# Patient Record
Sex: FEMALE | Race: WHITE | URBAN - METROPOLITAN AREA
[De-identification: names, ages, dates, MRNs, and addresses within clinical notes are randomized per-mention and may not be internally consistent; named-entity substitution may affect disease eponyms.]

---

## 2018-09-05 ENCOUNTER — APPOINTMENT (RX ONLY)
Dept: URBAN - METROPOLITAN AREA CLINIC 34 | Facility: CLINIC | Age: 83
Setting detail: DERMATOLOGY
End: 2018-09-05

## 2018-09-05 VITALS
SYSTOLIC BLOOD PRESSURE: 140 MMHG | DIASTOLIC BLOOD PRESSURE: 79 MMHG | HEART RATE: 57 BPM | HEIGHT: 68 IN | WEIGHT: 142 LBS

## 2018-09-05 DIAGNOSIS — R03.0 ELEVATED BLOOD-PRESSURE READING, WITHOUT DIAGNOSIS OF HYPERTENSION: ICD-10-CM

## 2018-09-05 DIAGNOSIS — L30.4 ERYTHEMA INTERTRIGO: ICD-10-CM

## 2018-09-05 PROBLEM — F32.9 MAJOR DEPRESSIVE DISORDER, SINGLE EPISODE, UNSPECIFIED: Status: ACTIVE | Noted: 2018-09-05

## 2018-09-05 PROCEDURE — ? COUNSELING

## 2018-09-05 PROCEDURE — 99202 OFFICE O/P NEW SF 15 MIN: CPT

## 2018-09-05 PROCEDURE — ? PRESCRIPTION

## 2018-09-05 RX ORDER — DESONIDE 0.5 MG/G
THIN LAYER CREAM TOPICAL BID
Qty: 30 | Refills: 1 | Status: ERX | COMMUNITY
Start: 2018-09-05

## 2018-09-05 RX ADMIN — DESONIDE THIN LAYER: 0.5 CREAM TOPICAL at 19:28

## 2018-09-05 ASSESSMENT — LOCATION SIMPLE DESCRIPTION DERM
LOCATION SIMPLE: RIGHT THIGH
LOCATION SIMPLE: ABDOMEN
LOCATION SIMPLE: LEFT BREAST
LOCATION SIMPLE: LEFT THIGH

## 2018-09-05 ASSESSMENT — LOCATION DETAILED DESCRIPTION DERM
LOCATION DETAILED: LEFT RIB CAGE
LOCATION DETAILED: LEFT ANTERIOR PROXIMAL THIGH
LOCATION DETAILED: RIGHT ANTERIOR PROXIMAL THIGH
LOCATION DETAILED: LEFT AXILLARY TAIL OF BREAST

## 2018-09-05 ASSESSMENT — LOCATION ZONE DERM
LOCATION ZONE: LEG
LOCATION ZONE: TRUNK

## 2018-09-05 NOTE — PROCEDURE: MIPS QUALITY
Quality 226: Preventive Care And Screening: Tobacco Use: Screening And Cessation Intervention: Patient screened for tobacco and never smoked
Quality 130: Documentation Of Current Medications In The Medical Record: Current Medications Documented
Quality 128: Preventive Care And Screening: Body Mass Index (Bmi) Screening And Follow-Up Plan: BMI is documented within normal parameters and no follow-up plan is required.
Detail Level: Detailed
Quality 317: Preventative Care And Screening: Screening For High Blood Pressure And Follow-Up Documented: Pre-hypertensive or hypertensive blood pressure reading documented, and the indicated follow-up is documented

## 2018-09-05 NOTE — PROCEDURE: COUNSELING
Detail Level: Detailed
Patient Specific Counseling (Will Not Stick From Patient To Patient): Reviewed that today on exam - suspect intertrigo- the script she has been using has been more helpful - however would rather move her to just steroid and a lower strength steroid and bring in some other measures to manage.  Do not find evidence of fungal infection today - did share that steroid use if it worsens the condition than there may be fungal present.  She has been using the mixed script so doing KOH would not be helpful - very little scale and no satellite lesions seen. \\nReviewed and handout given:\\nIntertrigo is a form of skin irritation that occurs when skin rest on skin such as in the arm pit, the creases of the neck, beneath breasts or in the groin. Factors that contribute to the development of this is moisture, friction, warmth and sweat retention. Treatment of choice is reducing the moisture by drying several times after bathing even using hair dryers to decrease the moisture. Mild topical steroids can decrease the irritation. Weight reduction is also helpful. Occasionally secondary infections can develop requiring topical antibiotics and or anti-fungal medications. \\n\\nWho gets intertrigo?\\nIntertrigo can affect males and females of any age. It is particularly common in people that are overweight or obese. Other contributing factors are: Genetic tendency to skin disease, excessive sweating.\\n\\nWhat are the features of intertrigo?\\nIntertrigo can be recent onset recurrent, or chronic defined as being present for more than 6 weeks. The exact appearance and behavior depends on the underlying cause or causes. The skin affected by intertrigo is inflamed. You will see that the is reddened and it cam uncomfortable due to associated itching or tenderness.  It may become moist and macerated, leading to cracks)and peeling.\\n\\nWhat is the cause of intertrigo?\\nIntertrigo is due to genetic and environmental factors.\\nFlexural skin has relatively high surface temperature\\nMoisture from insensible water loss and sweating cannot evaporate due to occlusion.\\nFriction from movement of adjacent skin results in chafing.\\nThe microorganisms that are normally resident on flexural skin, such as bacteria and yeast multiply in warm moist environments and may contribute to the inflammation. \\nWe can classify intertrigo into infectious and inflammatory origin but there is often overlap.\\n\\nInstructions for your treatment:\\n1.  Dry well likely will need to 2 - 3 x daily - with hair dryer on COLD setting.\\n2.  Apply Desonide to the affected areas 2 x daily\\n3.  Consider wearing a thin mens cotton sleeveless undershirt under the bra.\\n4.  Wear loose fitting clothing - keep as cool as possible - and loose clothing\\n5.  Treat with desonide . no longer than 2 weeks- if clear before then stop and move to maintenance program as noted below- if not clear at 2 weeks continuing to do the same thing likely will not improve it.  \\n6 . When clear - continue measures of drying, looseness of clothing and move to a powder Zeasorb or gold Banks is fine - or cornstarch base.  apply thin layer after using hair dryer on cold setting.  This may need to be repeated.\\n7.  Advised to stop any baby wipes being used on the skin move to Cetaphil Gentle Cleanser to make wipes when using toliet. \\n8.  Recommend moisturizing the skin well neck to toes- using Cetaphil or CeraVe cream in Jars- this will help maintain barrier of the skin from things potentially drying.   \\n9.  Return in 3 - 4 weeks - call earlier if concerns.\\n\\nIf the rash returns return to your treatment - call clinic first. If you do not respond to treatment stop the treatment and maintain follow up appointment use drying measures and powder.\\n\\nTo prevent it from returning, cooling the skin off and preventing moisture from forming is helpful. Hair dryer on the cool setting and Talcum powder is helpful. Do not apply Talcum powder to moist skin. This should be applied to dry skin to prevent moisture and friction. \\n
Detail Level: Simple

## 2018-10-01 ENCOUNTER — APPOINTMENT (RX ONLY)
Dept: URBAN - METROPOLITAN AREA CLINIC 34 | Facility: CLINIC | Age: 83
Setting detail: DERMATOLOGY
End: 2018-10-01

## 2018-10-01 VITALS
DIASTOLIC BLOOD PRESSURE: 59 MMHG | HEART RATE: 57 BPM | HEIGHT: 68 IN | SYSTOLIC BLOOD PRESSURE: 116 MMHG | WEIGHT: 144 LBS

## 2018-10-01 DIAGNOSIS — L81.0 POSTINFLAMMATORY HYPERPIGMENTATION: ICD-10-CM

## 2018-10-01 DIAGNOSIS — L30.4 ERYTHEMA INTERTRIGO: ICD-10-CM

## 2018-10-01 PROBLEM — F41.9 ANXIETY DISORDER, UNSPECIFIED: Status: ACTIVE | Noted: 2018-10-01

## 2018-10-01 PROCEDURE — ? COUNSELING

## 2018-10-01 PROCEDURE — 99212 OFFICE O/P EST SF 10 MIN: CPT

## 2018-10-01 ASSESSMENT — LOCATION DETAILED DESCRIPTION DERM
LOCATION DETAILED: LEFT AXILLARY VAULT
LOCATION DETAILED: LEFT RIB CAGE

## 2018-10-01 ASSESSMENT — LOCATION ZONE DERM
LOCATION ZONE: TRUNK
LOCATION ZONE: AXILLAE

## 2018-10-01 ASSESSMENT — LOCATION SIMPLE DESCRIPTION DERM
LOCATION SIMPLE: LEFT AXILLARY VAULT
LOCATION SIMPLE: ABDOMEN

## 2018-10-01 NOTE — PROCEDURE: COUNSELING
Detail Level: Simple
Patient Specific Counseling (Will Not Stick From Patient To Patient): She is much better- and improved- and pleased overall.  advised to start treatment again under the left axilla region.  Treat as previously discussed and then stop- and move to measures of keeping things managed and controlled with keeping dry . She has also done well moisturizing and advised to continue on that process.

## 2018-10-01 NOTE — HPI: RASH (INTERTRIGO)
Is This A New Presentation, Or A Follow-Up?: Follow Up Intertrigo
Additional History: Patient states eruption resolved within a week after starting treatment.

## 2018-10-01 NOTE — PROCEDURE: MIPS QUALITY
Quality 128: Preventive Care And Screening: Body Mass Index (Bmi) Screening And Follow-Up Plan: BMI is documented within normal parameters and no follow-up plan is required.
Quality 226: Preventive Care And Screening: Tobacco Use: Screening And Cessation Intervention: Patient screened for tobacco and never smoked
Quality 317: Preventative Care And Screening: Screening For High Blood Pressure And Follow-Up Documented: Pre-hypertensive or hypertensive blood pressure reading documented, and the indicated follow-up is documented
Quality 130: Documentation Of Current Medications In The Medical Record: Current Medications Documented
Detail Level: Detailed

## 2019-02-06 ENCOUNTER — APPOINTMENT (RX ONLY)
Dept: URBAN - METROPOLITAN AREA CLINIC 34 | Facility: CLINIC | Age: 84
Setting detail: DERMATOLOGY
End: 2019-02-06

## 2019-02-06 VITALS
DIASTOLIC BLOOD PRESSURE: 75 MMHG | WEIGHT: 148 LBS | SYSTOLIC BLOOD PRESSURE: 142 MMHG | HEIGHT: 68 IN | HEART RATE: 61 BPM

## 2019-02-06 DIAGNOSIS — R03.0 ELEVATED BLOOD-PRESSURE READING, WITHOUT DIAGNOSIS OF HYPERTENSION: ICD-10-CM

## 2019-02-06 DIAGNOSIS — Z87.891 PERSONAL HISTORY OF NICOTINE DEPENDENCE: ICD-10-CM

## 2019-02-06 DIAGNOSIS — Z23 ENCOUNTER FOR IMMUNIZATION: ICD-10-CM

## 2019-02-06 DIAGNOSIS — L30.4 ERYTHEMA INTERTRIGO: ICD-10-CM

## 2019-02-06 PROCEDURE — ? PRESCRIPTION

## 2019-02-06 PROCEDURE — 99213 OFFICE O/P EST LOW 20 MIN: CPT

## 2019-02-06 PROCEDURE — ? COUNSELING

## 2019-02-06 RX ORDER — CICLOPIROX OLAMINE 7.7 MG/G
THIN LAYER CREAM TOPICAL
Qty: 30 | Refills: 1 | Status: ERX | COMMUNITY
Start: 2019-02-06

## 2019-02-06 RX ADMIN — CICLOPIROX OLAMINE THIN LAYER: 7.7 CREAM TOPICAL at 17:45

## 2019-02-06 ASSESSMENT — LOCATION SIMPLE DESCRIPTION DERM
LOCATION SIMPLE: ABDOMEN
LOCATION SIMPLE: LEFT AXILLARY VAULT

## 2019-02-06 ASSESSMENT — LOCATION ZONE DERM
LOCATION ZONE: AXILLAE
LOCATION ZONE: TRUNK

## 2019-02-06 ASSESSMENT — LOCATION DETAILED DESCRIPTION DERM
LOCATION DETAILED: LEFT RIB CAGE
LOCATION DETAILED: LEFT AXILLARY VAULT
LOCATION DETAILED: RIGHT RIB CAGE

## 2019-02-06 NOTE — PROCEDURE: COUNSELING
Patient Specific Counseling (Will Not Stick From Patient To Patient): Patient notes things get better and about completely gone then flare like crazy - and it is very bothersome to her.  She is erupted under the breast and also in the left axilla region.  Features have represented intertrigo- today it more represents a yeast/fungal infection due to satellite lesions . We talked about this- and she wondered about scraping or biopsy.  Advised that would recommend we treat again - and consider on follow up if we need to something further . patient states that she has treated as follows- gentle wash, use of topical steroid when flared - and CeraVe lotion.  She does dry well and hair dryer on cold setting - she is not using powder.  She also uses cotton under the breasts and has worn various bra's . After much discussion decided on the following plan:\\n\\n1.  Entire focus on staying dry\\n2.  Put up topical steroid - do not use it.\\n3.  Treat with prescriptive antifungal 2 x daily under the breasts- x 2 weeks- if not improving clear stop and just use powder as noted below.  If becoming worse she is to contact the clinic to be seen.  If clear before 2 weeks stop the antifungal cream- and move to keeping dry as noted below\\n4.  After drying with hair dryer 2 x daily - apply thin layer of Zeasorb AF- powder to the areas.  She can do this in between the antifungal cream if she is sweating there- then stick with the antifungal cream 2 x daily as noted above.  \\n5. Return to clinic for re-evaluation as recommended.\\n\\nAdvised and reviewed how fungal infections can differ from just straight irritation - and features on this have changed.  Concerns about KOH or biopsy at this point is that she could have developed majocchi's- and may not show on KOH - and we may not need to biopsy . So we will treat again first and then re-evaluate.  She and her daughter are accepting of the plan.

## 2019-03-21 ENCOUNTER — APPOINTMENT (RX ONLY)
Dept: URBAN - METROPOLITAN AREA CLINIC 34 | Facility: CLINIC | Age: 84
Setting detail: DERMATOLOGY
End: 2019-03-21

## 2019-03-21 VITALS
SYSTOLIC BLOOD PRESSURE: 147 MMHG | HEIGHT: 68 IN | DIASTOLIC BLOOD PRESSURE: 74 MMHG | HEART RATE: 52 BPM | WEIGHT: 148 LBS

## 2019-03-21 DIAGNOSIS — L30.4 ERYTHEMA INTERTRIGO: ICD-10-CM

## 2019-03-21 DIAGNOSIS — Z87.891 PERSONAL HISTORY OF NICOTINE DEPENDENCE: ICD-10-CM

## 2019-03-21 DIAGNOSIS — R03.0 ELEVATED BLOOD-PRESSURE READING, WITHOUT DIAGNOSIS OF HYPERTENSION: ICD-10-CM

## 2019-03-21 DIAGNOSIS — Z23 ENCOUNTER FOR IMMUNIZATION: ICD-10-CM

## 2019-03-21 PROBLEM — L85.3 XEROSIS CUTIS: Status: ACTIVE | Noted: 2019-03-21

## 2019-03-21 PROBLEM — E03.9 HYPOTHYROIDISM, UNSPECIFIED: Status: ACTIVE | Noted: 2019-03-21

## 2019-03-21 PROCEDURE — 99213 OFFICE O/P EST LOW 20 MIN: CPT

## 2019-03-21 PROCEDURE — ? COUNSELING

## 2019-03-21 ASSESSMENT — LOCATION SIMPLE DESCRIPTION DERM
LOCATION SIMPLE: LEFT AXILLARY VAULT
LOCATION SIMPLE: ABDOMEN

## 2019-03-21 ASSESSMENT — LOCATION ZONE DERM
LOCATION ZONE: TRUNK
LOCATION ZONE: AXILLAE

## 2019-03-21 NOTE — PROCEDURE: MIPS QUALITY
Detail Level: Detailed
Quality 474: Zoster Vaccination Status: Shingrix vaccination previously received
Additional Notes: Recommend that the patient, if they have not, consider discussing with their primary care provider or local pharmacist the shingles/shingrix vaccine.

## 2019-03-21 NOTE — PROCEDURE: COUNSELING
Detail Level: Detailed
Detail Level: Simple
Quality 110: Preventive Care And Screening: Influenza Immunization: Influenza Immunization Administered during Influenza season
Quality 111:Pneumonia Vaccination Status For Older Adults: Pneumococcal Vaccination Previously Received
Patient Specific Counseling (Will Not Stick From Patient To Patient): She is clear under the breasts and doing very well.  Slight erythema and involvement in left axilla region- advised that this provider would recommend she return to treatment with antifungal in that region.  Treat 2 x daily until clear.  Then move to maintenance with drying well and using powder - Zeasorb AF- might prove helpful ongoing - she could do as she is and alternate with Gold Banks . She is happy with treatment - and that things are \"so much better.\"\\n\\nAdvised she contact her pharmacy if she requires refill of the antifungal cream also recommend she label the steroid she has and the antifungal so she remembers which is which.  She is to call if any concerns- and return to clinic if things are not managing well She is aware that she will have to return annually at least to maintain active script.
Quality 226: Preventive Care And Screening: Tobacco Use: Screening And Cessation Intervention: Patient screened for tobacco and never smoked
Quality 317: Preventative Care And Screening: Screening For High Blood Pressure And Follow-Up Documented: Pre-hypertensive or hypertensive blood pressure reading documented, and the indicated follow-up is documented
